# Patient Record
Sex: FEMALE | Race: OTHER
[De-identification: names, ages, dates, MRNs, and addresses within clinical notes are randomized per-mention and may not be internally consistent; named-entity substitution may affect disease eponyms.]

---

## 2018-02-23 ENCOUNTER — HOSPITAL ENCOUNTER (EMERGENCY)
Dept: HOSPITAL 61 - ER | Age: 23
Discharge: HOME | End: 2018-02-23
Payer: SELF-PAY
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
## 2018-02-23 DIAGNOSIS — S39.012A: Primary | ICD-10-CM
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
## 2018-02-23 LAB — URINE HCG POC: (no result)

## 2018-02-23 PROCEDURE — 99283 EMERGENCY DEPT VISIT LOW MDM: CPT

## 2018-02-23 PROCEDURE — 81025 URINE PREGNANCY TEST: CPT

## 2018-10-06 ENCOUNTER — HOSPITAL ENCOUNTER (EMERGENCY)
Dept: HOSPITAL 61 - ER | Age: 23
Discharge: HOME | End: 2018-10-06
Payer: SELF-PAY

## 2018-10-06 VITALS — WEIGHT: 220 LBS | HEIGHT: 62 IN | BODY MASS INDEX: 40.48 KG/M2

## 2018-10-06 VITALS — SYSTOLIC BLOOD PRESSURE: 129 MMHG | DIASTOLIC BLOOD PRESSURE: 80 MMHG

## 2018-10-06 DIAGNOSIS — K08.89: Primary | ICD-10-CM

## 2018-10-06 PROCEDURE — 99283 EMERGENCY DEPT VISIT LOW MDM: CPT

## 2018-10-06 NOTE — PHYS DOC
Past Medical History


Past Medical History:  No Pertinent History


Past Surgical History:  No Surgical History


Alcohol Use:  None


Drug Use:  None





Adult General


Chief Complaint


Chief Complaint:  Toothache





HPI


HPI





Patient is a 23  year old American female presents with left upper posterior 

molar pain for the past several days. Patient has been using ibuprofen and 

Orajel with limited relief. She has an appointment to meet with the dentist 

next Tuesday. Patient reports tooth sensitivity. No trismus, dysphonia, 

dysphagia or drooling. No other acute symptoms or complaints. []





Review of Systems


Review of Systems


ROS as per HPI





All other systems were reviewed and found to be within normal limits, except as 

documented in this note.





Allergies


Allergies





Allergies








Coded Allergies Type Severity Reaction Last Updated Verified


 


  No Known Drug Allergies    9/10/14 No











Physical Exam


Physical Exam





Constitutional: Well developed, well nourished, no acute distress, non-toxic 

appearance. []


HENT: Normocephalic, atraumatic, bilateral external ears normal, oropharynx 

moist, left upper posterior molar pain, tenderness.. []


Eyes: PERRLA, EOMI, conjunctiva normal, no discharge. [] 


Neck: Normal range of motion, no tenderness, supple, no stridor. [] 


Cardiovascular:Heart rate regular rhythm, no murmur []





EKG


EKG


[]





Radiology/Procedures


Radiology/Procedures


[]





Course & Med Decision Making


Course & Med Decision Making


Pertinent Labs and Imaging studies reviewed. (See chart for details)





[No soft tissue abscess, dental pain likely secondary to caries. Patient has 

established dental follow-up.]





Dragon Disclaimer


Dragon Disclaimer


This electronic medical record was generated, in whole or in part, using a 

voice recognition dictation system.





Departure


Departure


Impression:  


 Primary Impression:  


 Pain, dental


Disposition:  01 HOME, SELF-CARE


Condition:  GOOD


Patient Instructions:  Dental Pain, Easy-to-Read





Additional Instructions:  


Please take antibiotics and pain medications as directed.  Follow up with your 

dentist as scheduled.


Scripts


Tramadol Hcl (TRAMADOL HCL) 50 Mg Tablet


50 MG PO DAILY PRN for PAIN, #10 TAB 0 Refills


   Prov: NANDO WOOD DO         10/6/18 


Penicillin V Potassium (PENICILLIN V POTASSIUM) 500 Mg Tablet


1 TAB PO TID, #30 TAB


   Prov: NANDO WOOD DO         10/6/18











NANDO WOOD DO Oct 6, 2018 05:37

## 2021-06-16 ENCOUNTER — HOSPITAL ENCOUNTER (EMERGENCY)
Dept: HOSPITAL 61 - ER | Age: 26
Discharge: HOME | End: 2021-06-16
Payer: SELF-PAY

## 2021-06-16 VITALS — WEIGHT: 240.3 LBS | HEIGHT: 62 IN | BODY MASS INDEX: 44.22 KG/M2

## 2021-06-16 VITALS — SYSTOLIC BLOOD PRESSURE: 136 MMHG | DIASTOLIC BLOOD PRESSURE: 72 MMHG

## 2021-06-16 DIAGNOSIS — R51.9: ICD-10-CM

## 2021-06-16 DIAGNOSIS — R05: ICD-10-CM

## 2021-06-16 DIAGNOSIS — Z20.822: ICD-10-CM

## 2021-06-16 DIAGNOSIS — R50.9: Primary | ICD-10-CM

## 2021-06-16 LAB
% LYMPHS: 17 % (ref 24–48)
% MONOS: 10 % (ref 0–10)
% SEGS: 70 % (ref 35–66)
ANION GAP SERPL CALC-SCNC: 9 MMOL/L (ref 6–14)
ANISOCYTOSIS BLD QL SMEAR: SLIGHT
BASOPHILS # BLD AUTO: 0.1 X10^3/UL (ref 0–0.2)
BASOPHILS NFR BLD AUTO: 1 % (ref 0–3)
BASOPHILS NFR BLD: 1 % (ref 0–3)
BUN SERPL-MCNC: 6 MG/DL (ref 7–20)
CALCIUM SERPL-MCNC: 8.8 MG/DL (ref 8.5–10.1)
CHLORIDE SERPL-SCNC: 105 MMOL/L (ref 98–107)
CO2 SERPL-SCNC: 26 MMOL/L (ref 21–32)
CREAT SERPL-MCNC: 0.8 MG/DL (ref 0.6–1)
EOSINOPHIL NFR BLD AUTO: 2 % (ref 0–5)
EOSINOPHIL NFR BLD: 0.2 X10^3/UL (ref 0–0.7)
EOSINOPHIL NFR BLD: 1 % (ref 0–3)
ERYTHROCYTE [DISTWIDTH] IN BLOOD BY AUTOMATED COUNT: 16.5 % (ref 11.5–14.5)
GFR SERPLBLD BASED ON 1.73 SQ M-ARVRAT: 88.1 ML/MIN
GLUCOSE SERPL-MCNC: 108 MG/DL (ref 70–99)
HCT VFR BLD CALC: 35.8 % (ref 36–47)
HGB BLD-MCNC: 11.6 G/DL (ref 12–15.5)
HYPOCHROMIA BLD QL SMEAR: (no result)
LYMPHOCYTES # BLD: 2.6 X10^3/UL (ref 1–4.8)
LYMPHOCYTES NFR BLD AUTO: 15 % (ref 24–48)
MCH RBC QN AUTO: 23 PG (ref 25–35)
MCHC RBC AUTO-ENTMCNC: 32 G/DL (ref 31–37)
MCV RBC AUTO: 71 FL (ref 79–100)
MICROCYTES BLD QL SMEAR: (no result)
MONO #: 1 X10^3/UL (ref 0–1.1)
MONOCYTES NFR BLD: 6 % (ref 0–9)
NEUT #: 13 X10^3/UL (ref 1.8–7.7)
NEUTROPHILS NFR BLD AUTO: 77 % (ref 31–73)
OVALOCYTES BLD QL SMEAR: (no result)
PLATELET # BLD AUTO: 296 X10^3/UL (ref 140–400)
PLATELET # BLD EST: ADEQUATE 10*3/UL
POIKILOCYTOSIS BLD QL SMEAR: SLIGHT
POTASSIUM SERPL-SCNC: 4 MMOL/L (ref 3.5–5.1)
RBC # BLD AUTO: 5.07 X10^6/UL (ref 3.5–5.4)
SODIUM SERPL-SCNC: 140 MMOL/L (ref 136–145)
WBC # BLD AUTO: 17 X10^3/UL (ref 4–11)

## 2021-06-16 PROCEDURE — 36415 COLL VENOUS BLD VENIPUNCTURE: CPT

## 2021-06-16 PROCEDURE — 80048 BASIC METABOLIC PNL TOTAL CA: CPT

## 2021-06-16 PROCEDURE — 96360 HYDRATION IV INFUSION INIT: CPT

## 2021-06-16 PROCEDURE — U0003 INFECTIOUS AGENT DETECTION BY NUCLEIC ACID (DNA OR RNA); SEVERE ACUTE RESPIRATORY SYNDROME CORONAVIRUS 2 (SARS-COV-2) (CORONAVIRUS DISEASE [COVID-19]), AMPLIFIED PROBE TECHNIQUE, MAKING USE OF HIGH THROUGHPUT TECHNOLOGIES AS DESCRIBED BY CMS-2020-01-R: HCPCS

## 2021-06-16 PROCEDURE — 85025 COMPLETE CBC W/AUTO DIFF WBC: CPT

## 2021-06-16 PROCEDURE — 71045 X-RAY EXAM CHEST 1 VIEW: CPT

## 2021-06-16 PROCEDURE — 85007 BL SMEAR W/DIFF WBC COUNT: CPT

## 2021-06-16 PROCEDURE — 99284 EMERGENCY DEPT VISIT MOD MDM: CPT

## 2021-06-16 NOTE — RAD
Exam: Chest one view



INDICATION: Cough



TECHNIQUE: Frontal view of the chest



Comparisons: None



FINDINGS:

The cardiomediastinal silhouette and pulmonary vessels are within normal limits.



The lung and pleural spaces are clear.



IMPRESSION:

No acute cardiopulmonary process.



Electronically signed by: Celena Talavera MD (6/16/2021 8:40 PM) [facility]

## 2021-06-16 NOTE — ED.ADGEN
Past Medical History


Past Medical History:  No Pertinent History


Past Surgical History:  No Surgical History


Smoking Status:  Never Smoker


Alcohol Use:  None


Drug Use:  None





General Adult


EDM:


Chief Complaint:  MULTIPLE COMPLAINTS





HPI:


HPI:





Patient is a 26  year old female who presents emergency department with 

complaints of dry cough, fever, headache, and body aches since last night.  

Patient denies any decreased sense of taste/smell.  She denies any abdominal 

pain, nausea, vomiting, diarrhea, dysuria, hematuria, increased urinary 

frequency, difficulty voiding.  Patient denies any shortness of breath or 

wheezing.  She states that she has experienced chills with the body aches.  

Patient denies any photosensitivity, vision changes, neck pain, dizziness, 

numbness, tingling, or weakness.  She currently rates her pain a 4 out of 10 on 

the pain scale, she denies any alleviating or exacerbating factors.





Review of Systems:


Review of Systems:


Complete ROS is negative unless otherwise noted in HPI.





Current Medications:





Current Medications








 Medications


  (Trade)  Dose


 Ordered  Sig/Allison  Start Time


 Stop Time Status Last Admin


Dose Admin


 


 Acetaminophen


  (Tylenol)  650 mg  1X  ONCE  6/16/21 20:00


 6/16/21 20:03 DC 6/16/21 20:38


650 MG


 


 Ibuprofen


  (Motrin)  800 mg  1X  ONCE  6/16/21 20:00


 6/16/21 20:03 DC 6/16/21 20:38


800 MG


 


 Sodium Chloride  1,000 ml @ 


 1,000 mls/hr  1X  ONCE  6/16/21 20:30


 6/16/21 21:29 DC  














Allergies:


Allergies:





Allergies








Coded Allergies Type Severity Reaction Last Updated Verified


 


  No Known Drug Allergies    9/10/14 No











Physical Exam:


PE:


See Above


Constitutional: Well developed, well nourished, no acute distress, ill 

appearance, obese


HENT: Normocephalic, atraumatic, bilateral external ears normal, nose normal. []


Eyes: PERRLA, EOMI, conjunctiva normal, no discharge. [] 


Neck: Normal range of motion, no stridor. [] 


Cardiovascular:Heart rate regular rhythm


Lungs & Thorax:  Respirations even and unlabored, no retractions, no respiratory

 distress, lungs CTA


Abdomen: soft, no tenderness


Skin: Flushed, hot, dry, no rash


Extremities: No cyanosis, ROM intact, no edema. [] 


Neurologic: Alert and oriented X 3, normal motor, normal sensory, no focal 

deficits noted. []


Psychologic: Affect normal, judgement normal, mood normal. []





Current Patient Data:


Labs:





                                Laboratory Tests








Test


 6/16/21


20:30


 


White Blood Count


 17.0 x10^3/uL


(4.0-11.0)  H


 


Red Blood Count


 5.07 x10^6/uL


(3.50-5.40)


 


Hemoglobin


 11.6 g/dL


(12.0-15.5)  L


 


Hematocrit


 35.8 %


(36.0-47.0)  L


 


Mean Corpuscular Volume


 71 fL ()


L


 


Mean Corpuscular Hemoglobin


 23 pg (25-35)


L


 


Mean Corpuscular Hemoglobin


Concent 32 g/dL


(31-37)


 


Red Cell Distribution Width


 16.5 %


(11.5-14.5)  H


 


Platelet Count


 296 x10^3/uL


(140-400)


 


Neutrophils (%) (Auto) 77 % (31-73)  H


 


Lymphocytes (%) (Auto) 15 % (24-48)  L


 


Monocytes (%) (Auto) 6 % (0-9)  


 


Eosinophils (%) (Auto) 1 % (0-3)  


 


Basophils (%) (Auto) 1 % (0-3)  


 


Neutrophils # (Auto)


 13.0 x10^3/uL


(1.8-7.7)  H


 


Lymphocytes # (Auto)


 2.6 x10^3/uL


(1.0-4.8)


 


Monocytes # (Auto)


 1.0 x10^3/uL


(0.0-1.1)


 


Eosinophils # (Auto)


 0.2 x10^3/uL


(0.0-0.7)


 


Basophils # (Auto)


 0.1 x10^3/uL


(0.0-0.2)


 


Segmented Neutrophils % 70 % (35-66)  H


 


Lymphocytes % 17 % (24-48)  L


 


Monocytes % 10 % (0-10)  


 


Eosinophils % 2 % (0-5)  


 


Basophils % 1 % (0-3)  


 


Platelet Estimate


 Adequate


(ADEQUATE)


 


Hypochromasia Mod  


 


Poikilocytosis Slight  


 


Anisocytosis Slight  


 


Microcytosis Mod  


 


Ovalocytes Few  


 


Sodium Level


 140 mmol/L


(136-145)


 


Potassium Level


 4.0 mmol/L


(3.5-5.1)


 


Chloride Level


 105 mmol/L


()


 


Carbon Dioxide Level


 26 mmol/L


(21-32)


 


Anion Gap 9 (6-14)  


 


Blood Urea Nitrogen


 6 mg/dL (7-20)


L


 


Creatinine


 0.8 mg/dL


(0.6-1.0)


 


Estimated GFR


(Cockcroft-Gault) 88.1  





 


Glucose Level


 108 mg/dL


(70-99)  H


 


Calcium Level


 8.8 mg/dL


(8.5-10.1)





                                Laboratory Tests


6/16/21 20:30








                                Laboratory Tests


6/16/21 20:30











Vital Signs:





                                   Vital Signs








  Date Time  Temp Pulse Resp B/P (MAP) Pulse Ox O2 Delivery O2 Flow Rate FiO2


 


6/16/21 21:20 100.1       





 100.1       


 


6/16/21 19:35  119 18 151/65 (93) 98 Room Air  











EKG:


EKG:


[]





Heart Score:


C/O Chest Pain:  No





Radiology/Procedures:


Radiology/Procedures:


PROCEDURE: CHEST AP ONLY





Exam: Chest one view





INDICATION: Cough





TECHNIQUE: Frontal view of the chest





Comparisons: None





FINDINGS:


The cardiomediastinal silhouette and pulmonary vessels are within normal limits.





The lung and pleural spaces are clear.





IMPRESSION:


No acute cardiopulmonary process.[]





Course & Med Decision Making:


Course & Med Decision Making


Pertinent Labs and Imaging studies reviewed. (See chart for details)


26-year-old female who presents emergency department with multiple complaints.


Patient was febrile on arrival.  Patient was given 80 mg of ibuprofen and 1 g of

 Tylenol, her temperature broke after these medications.  Patient was also given

 a liter of normal saline.  Her heart rate reduced into the 90s, her vital signs

 are stable, chest x-ray is unremarkable.


CBC revealed white blood cell count of 17, hemoglobin 11.6, crit of 35.8, 13 

neutrophils, 70 segs, no bandemia; BMP reveals a glucose of 108 otherwise 

unremarkable.


Covid test is pending.  Patient was provided with quarantine instructions, she 

is encouraged to alternate Tylenol and ibuprofen as needed for pain.  

Symptomatic treatment is recommended.  Increase clear fluids, follow-up with 

primary care doctor return to the ER if symptoms worsen or fever does not res

pond to medications.





Patient verbalized an understanding of home care, medications, follow-up, and 

return to ED instructions and was in agreement with the plan of care.








COVID-19 CRITERIA:    The patient was evaluated during the global COVID-19 

pandemic, and that diagnosis was suspected/considered upon their initial 

presentation.  Their evaluation, treatment and testing was consistent with 

current guidelines for patients who present with complaints or symptoms that may

 be related to COVID-19.











[]





Dragon Disclaimer:


Dragon Disclaimer:


This electronic medical record was generated, in whole or in part, using a voice

 recognition dictation system.





Departure


Departure


Impression:  


   Primary Impression:  


   Fever


   Additional Impression:  


   Person under investigation for COVID-19


Disposition:  01 HOME / SELF CARE / HOMELESS


Condition:  STABLE


Referrals:  


NO PCP (PCP)


Patient Instructions:  Fever, Adult, Easy-to-Read, Upper Respiratory Infection, 

Adult, Easy-to-Read





Additional Instructions:  


Alternate Tylenol or ibuprofen as needed for pain/fever. Increase clear fluids. 

Avoid airway triggers such as smoke, fragrance, dust, and pollen. May take 

over-the-counter cough suppressants as needed. Follow-up with your primary care 

doctor in 1-2 days, return to the ER if symptoms worsen or fever does not 

respond to medications.  Please follow the following quarantine instructions, 

your Covid test should be available within the next 2 days.





You have been tested for or diagnosed with COVID-19. It is an infection caused 

by a new type 


of coronavirus. COVID-19 will cause cold-like or mild flu symptoms in most. It 

can cause 


more severe symptoms like problems breathing in some.





There is no treatment for COVID-19. The body will clear the infection over time.

 Self-care 


will help to ease discomfort.





Steps to Take:


Self-Care


Rest as needed. Healthy habits may help you feel better. Steps include:





Choose healthy foods including fruits and vegetables. Drink water throughout the

 day.


Get plenty of sleep each night.


If you smoke, try to quit. It may ease breathing.


Avoid alcohol.


Keep Others Healthy


The virus can spread to others. Droplets are released every time you sneeze or 

cough. The 


droplets can get into the mouth, nose, or eyes of people near you and lead to 

infection. To 


lower the chances of spreading COVID-19 to others:





Stay at home until your doctor has said it is safe to leave. If you tested po

sitive this 


will mean staying isolated until both of the following are true:





At least 7 days have passed since the start of illness.


You are free of fever for at least 72 hours without the use of medicine.


During this time:





 - Avoid public areas, events, or transportation. Do not return to work or 

school until your 


doctor has said it is safe to do so.


 - Call ahead if you need to go to a medical center. Let them know you may have 

COVID-19. It 


will help them guide you where to go. They may also ask you to wear a facemask 

when you come 


to the office.


 - If you call for emergency medical services, let them know you may have COVID-

19.


While at home:





 - Try to avoid close contact with others. Stay about 6 feet away.


 - If possible, spend most of your time in a separate room from others.


 - Use a face mask if you will be in close contact with others such as sharing a

 room or 


vehicle.


 - Have someone wipe down common surfaces in the home. Use household  

every day on 


areas like doorknobs, counters, or sinks.


 - Cough or sneeze into a tissue. Throw the tissue away right after use. If a 

tissue is not 


available, cough or sneeze into your elbow.


 - Wash your hands often. Wash them after sneezing or coughing. Use soap and 

water and wash 


for at least 20 seconds. Alcohol based hand  can be used if soap and 

water is not 


available.


 - Do not prepare food for others. Avoid sharing personal items like forks, 

spoons, or 


toothbrushes.


 - Avoid close contact with pets while you are sick. There is no evidence of the

 virus 


passing to pets. This is a safety step until more is known about this virus.


Isolation can be frustrating. Social interaction can help. Keep in touch with 

friends and 


family through phone and tech options. You can still interact with others in y

our home, just 


keep a safe distance of about 6 feet.





Follow-up:


Your doctors office will check in with you to see if there are any changes in 

your health. 


You may be asked to keep track of symptoms to share with them. They will also 

let you know 


when you are clear to be in public again.





Problems to Look Out For:


Contact your doctor if your recovery is not going as you expect. Get emergency c

are if you 


have problems such as:





 - Trouble breathing


 - Nonstop chest pain or pressure


 - Changes in awareness, confusion, or problems waking


 - Lips or face have bluish color


 - Worsening of symptoms


If you think you have an emergency, call for emergency medical services right 

away.





As taken from [facility]





COVID-19 Assessment:


COVID-19 Patient Risks:


Age 65 or older:  No


Sign of co-morbidity:  No


Exp to person + for COVID:  No


Exp to PUI:  No


Travel from affected area:  No


Lower respiratory symptoms:  Yes


Fever:  Yes





PPE Use:


Full PPE with N95 mask or PAPR:  Yes





Problem Qualifiers








   Primary Impression:  


   Fever


   Fever type:  unspecified  Qualified Codes:  R50.9 - Fever, unspecified








DIANA MCMAHAN APRN       Jun 16, 2021 20:56

## 2021-06-17 NOTE — NUR
IP: Pt returned my call. I informed her of the positive covid test and the need to 
quarantine for 14 days. Pt verbalized understanding.